# Patient Record
(demographics unavailable — no encounter records)

---

## 2025-05-05 NOTE — DISCUSSION/SUMMARY
[FreeTextEntry1] : discussed that ongoing symptoms may secondary to uri vs. seasonal allergies recommended use oral antihistamine in addition to Flonase daily RVP sent may contact office with any additional or worsened symptoms, any further concerns

## 2025-05-05 NOTE — HISTORY OF PRESENT ILLNESS
[EENT/Resp Symptoms] : EENT/RESPIRATORY SYMPTOMS [Nasal congestion] : nasal congestion [Cough] : cough [___ Week(s)] : [unfilled] week(s) [Sick Contacts: ___] : no sick contacts [Fever] : no fever [Ear Pain] : no ear pain [Sore Throat] : no sore throat [Decreased Appetite] : decreased appetite [FreeTextEntry3] : recent return from college  [FreeTextEntry5] : itchy eyes [de-identified] : no improvement with Robitussin, Claritin or Benadryl

## 2025-07-23 NOTE — PHYSICAL EXAM
[Chaperone Declined] : Chaperone offered however refused by patient, [Appropriately responsive] : appropriately responsive [Alert] : alert [No Acute Distress] : no acute distress [No Lymphadenopathy] : no lymphadenopathy [Soft] : soft [Non-tender] : non-tender [Non-distended] : non-distended [No HSM] : No HSM [No Lesions] : no lesions [No Mass] : no mass [Oriented x3] : oriented x3 [Examination Of The Breasts] : a normal appearance [Normal] : normal [No Masses] : no breast masses were palpable [FreeTextEntry1] : declines VE

## 2025-07-23 NOTE — PLAN
[FreeTextEntry1] : Routine GYN Exam -Discussed and reviewed importance of monthly BSE -Declines STI testing, importance safe sexual practices discussed -f/u PCP for recommended HCM, vaccinations and CA screening -pap @ 21 -change ocp to [facility]; declines extended cycle dosing. Oral contraceptive counseling provided to the patient.  Discussed risks and benefits, including thromboembolic events, especially in the setting of smoking, or in the setting of pre-existing medical conditions that predispose to adverse cardiovascular events.  Benign side effects reviewed as well as risk of unintended pregnancy.   Discussion regarding decreased contraceptive efficacy when taken with certain medications or when dosing schedule is erratic.  They do not protect against STI's. All questions answered.  rto 1 yr. sooner if needed.

## 2025-07-23 NOTE — HISTORY OF PRESENT ILLNESS
[FreeTextEntry1] : 17 y/o G0 presenting for [facility] exam. Pt takes [place] 0.8-25mg-mcg OCP for heavy and painful periods. Pt states her menstrual cramps have been worsening and has requested a change in OCP. Pt denies sexual activity.  Pt going into second yr @  [employer], plans to major in Bio[employer] and become a dentist.  ALL PCN [Patient refuses STI testing] : Patient refuses STI testing